# Patient Record
Sex: MALE | Race: WHITE | Employment: OTHER | URBAN - NONMETROPOLITAN AREA
[De-identification: names, ages, dates, MRNs, and addresses within clinical notes are randomized per-mention and may not be internally consistent; named-entity substitution may affect disease eponyms.]

---

## 2023-08-25 ENCOUNTER — HOSPITAL ENCOUNTER (EMERGENCY)
Age: 83
Discharge: HOME | End: 2023-08-25
Attending: EMERGENCY MEDICINE
Payer: COMMERCIAL

## 2023-08-25 VITALS
HEIGHT: 73 IN | SYSTOLIC BLOOD PRESSURE: 121 MMHG | OXYGEN SATURATION: 98 % | DIASTOLIC BLOOD PRESSURE: 77 MMHG | TEMPERATURE: 98.3 F | WEIGHT: 202 LBS | HEART RATE: 66 BPM | BODY MASS INDEX: 26.77 KG/M2 | RESPIRATION RATE: 16 BRPM

## 2023-08-25 DIAGNOSIS — L03.116 CELLULITIS OF LEFT LOWER EXTREMITY: Primary | ICD-10-CM

## 2023-08-25 PROCEDURE — 6370000000 HC RX 637 (ALT 250 FOR IP): Performed by: EMERGENCY MEDICINE

## 2023-08-25 PROCEDURE — 99283 EMERGENCY DEPT VISIT LOW MDM: CPT

## 2023-08-25 RX ORDER — CEPHALEXIN 500 MG/1
500 CAPSULE ORAL 4 TIMES DAILY
Qty: 28 CAPSULE | Refills: 0 | Status: SHIPPED | OUTPATIENT
Start: 2023-08-25 | End: 2023-09-01

## 2023-08-25 RX ORDER — CEPHALEXIN 500 MG/1
500 CAPSULE ORAL ONCE
Status: COMPLETED | OUTPATIENT
Start: 2023-08-25 | End: 2023-08-25

## 2023-08-25 RX ORDER — DOXYCYCLINE HYCLATE 100 MG
100 TABLET ORAL ONCE
Status: COMPLETED | OUTPATIENT
Start: 2023-08-25 | End: 2023-08-25

## 2023-08-25 RX ORDER — DOXYCYCLINE HYCLATE 100 MG
100 TABLET ORAL 2 TIMES DAILY
Qty: 14 TABLET | Refills: 0 | Status: SHIPPED | OUTPATIENT
Start: 2023-08-25 | End: 2023-09-01

## 2023-08-25 RX ADMIN — DOXYCYCLINE HYCLATE 100 MG: 100 TABLET, COATED ORAL at 14:56

## 2023-08-25 RX ADMIN — CEPHALEXIN 500 MG: 500 CAPSULE ORAL at 14:56

## 2023-08-25 ASSESSMENT — ENCOUNTER SYMPTOMS
SHORTNESS OF BREATH: 0
COLOR CHANGE: 1
NAUSEA: 0
COUGH: 0
VOMITING: 0

## 2023-08-25 ASSESSMENT — PAIN - FUNCTIONAL ASSESSMENT: PAIN_FUNCTIONAL_ASSESSMENT: NONE - DENIES PAIN

## 2023-08-25 NOTE — ED NOTES
Reviewed patient discharge instructions at this time, copy given to patient. No questions or concerns. Patient voiced understanding.         Dagmar Guerrero RN  08/25/23 [id number]

## 2023-08-25 NOTE — ED PROVIDER NOTES
Comments: The area of most redness, mid thigh, is firm, no obvious fluid collection, no fluctuance. Slightly tender to palpation. Erythematous, warm to the touch. Neurological:      General: No focal deficit present. Mental Status: He is alert and oriented to person, place, and time. Psychiatric:         Mood and Affect: Mood normal.         Thought Content: Thought content normal.             PROCEDURES:  Unless otherwise noted below, none. Procedures      MEDICATIONS:   Medications   cephALEXin (KEFLEX) capsule 500 mg (has no administration in time range)   doxycycline hyclate (VIBRA-TABS) tablet 100 mg (has no administration in time range)       _____________________________________    MEDICAL DECISION MAKING:     Patient is a 79 y.o. male with a significant PMHx of CAD and CHF, presenting emergency department today with concerns of what appears to be cellulitis from possible bug bites? Earlier some other lesions that are scattered across his leg. Patient gardens, is outside quite frequently. No known tick bites. Other hives, will treat with Keflex and doxycycline. No abscess today in the ED. No SIRS criteria, and otherwise appears very well. Interactive, conversational, and discharged home in stable condition to follow-up with PCP. Strict return precautions provided at length. Patient very comfortable with this plan. Is this patient to be included in the SEP-1 Core Measure due to severe sepsis or septic shock? No   Exclusion criteria - the patient is NOT to be included for SEP-1 Core Measure due to:  2+ SIRS criteria are not met      CLINICAL IMPRESSION:     ICD-10-CM    1. Cellulitis of left lower extremity  L03. 116             DISPOSITION:  I discussed the results, including any incidental findings, with patient and through shared decision making;   Disposition today from the ED will be: Discharge to home in fair condition. Questions answered.  They are agreeable to plan and express understanding of plan. Social Determinants : None      CRITICAL CARE:   Total critical care time is 00 minutes, which excludes separately billable procedures and updating family. Time spent is specifically for management of the presenting complaint and symptoms initially, direct bedside care, reevaluation, review of records, and consultation. There was a high probability of clinically significant life-threatening deterioration in the patient's condition, which required my urgent intervention. _____________________________________    DISCHARGE MEDICATIONS (if applicable):  New Prescriptions    CEPHALEXIN (KEFLEX) 500 MG CAPSULE    Take 1 capsule by mouth 4 times daily for 7 days    DOXYCYCLINE HYCLATE (VIBRA-TABS) 100 MG TABLET    Take 1 tablet by mouth 2 times daily for 7 days       DISCONTINUED MEDICATIONS:  Discontinued Medications    No medications on file            DISPOSITION REFERRAL (if applicable):  [address] [place] [address] Emergency Department  [address] [place] [address]  [phone]    As needed, If symptoms worsen    Juancho Lima MD  [address] [place] [address],[address]  [phone]    Schedule an appointment as soon as possible for a visit         _____________________________________      Nancy Frazier DO, ([facility]) am the primary attending of record and contributed the majority of evaluation and treatment of emergent care for this encounter. This chart was generated in part by using Dragon Dictation system and may contain errors related to that system including errors in grammar, punctuation, and spelling, as well as words and phrases that may be inappropriate. If there are any questions or concerns please feel free to contact the dictating provider for clarification.      MADDISON FRAZIER DO (electronically signed)   [id number] DO  08/25/23 [id number]

## 2025-05-16 NOTE — PROGRESS NOTES
Crow Mukherjee    Age 84 y.o.    male    1940    [facility] [phone]    5/27/2025  Arrival Time_____________  OR Time____________30 Min     Procedure(s):  ESOPHAGOGASTRODUODENOSCOPY                      Monitor Anesthesia Care    Surgeon(s):  Wilmer Mcgarry, MD       Phone [phone] (home)     [facility]  Date  Info Source  Home  Cell         Work  _____________________________________________________________________  _____________________________________________________________________  _____________________________________________________________________  _____________________________________________________________________  _____________________________________________________________________    PCP _____________________________ Phone_________________     H&P  ________________  Bringing      Chart              Epic      DOS      Called________  EKG ________________   Bringing      Chart              Epic      DOS      Called________  LABS________________   Bringing     Chart              Epic      DOS      Called________  Cardiac Clearance ______ Bringing      Chart              Epic      DOS      Called________  Pulmonary Clearance____ Bringing      Chart              Epic      DOS      Called________    Cardiologist________________________ Phone___________________________  Pulmonologist_______________________Phone___________________________    ? Advance Directives   ? Nondenominational concerns / Waiver on Chart            PAT Communications________________  ? Pre-op Instructions Given /Understood          _________________________________  ? Directions to Surgery Center                          _________________________________  ? Transportation Home_______________      __________________________________  ? Crutches/Walker__________________        __________________________________    Orders: Hard copy/ EPIC                 Transcribed/ EPIC              _______Wt.    ________Pharmacy

## 2025-05-19 RX ORDER — LOSARTAN POTASSIUM AND HYDROCHLOROTHIAZIDE 25; 100 MG/1; MG/1
1 TABLET ORAL DAILY
COMMUNITY

## 2025-05-19 RX ORDER — M-VIT,TX,IRON,MINS/CALC/FOLIC 27MG-0.4MG
1 TABLET ORAL DAILY
COMMUNITY

## 2025-05-19 RX ORDER — FOLIC ACID 1 MG/1
1 TABLET ORAL DAILY
COMMUNITY

## 2025-05-19 RX ORDER — TRAZODONE HYDROCHLORIDE 50 MG/1
50 TABLET ORAL NIGHTLY
COMMUNITY

## 2025-05-19 NOTE — PROGRESS NOTES
Date and time of surgery :   05/27/2025  1430           Arrival Time:  1330     Bring Picture ID and insurance card.  Please wear simple, loose fitting clothing to the hospital.   Do not bring valuables (money, credit cards, checkbooks, etc.)   Do not wear any makeup (including  eye makeup) and no nail polish or artificial nails on your fingers or toes.  DO NOT wear any jewelry or piercings on day of surgery.  All body piercing jewelry must be removed.  If you have dentures, they will be removed before going to the OR; we will provide you a container.  If you wear contact lenses or glasses, they will be removed; please bring a case for them.  Shower the evening before or morning of surgery with antibacterial soap.  Nothing to eat or drink after midnight the day before surgery.   You may brush your teeth and gargle the morning of surgery.  DO NOT SWALLOW WATER.   Take Carvedilol DOS - if unable to swallow, bring medication with you.  Aspirin, Ibuprofen, Advil, Naproxen, Vitamin E and other Anti-inflammatory products and supplements should be stopped for 5 -7days before surgery or as directed by your physician.  Do not smoke or drink any alcoholic beverages 24 hours prior to surgery.  This includes NA Beer. Refrain from the usage of any recreational drugs, including non-prescribed prescription drugs.   You MUST plan for a responsible adult to stay on site while you are here and take you home after your surgery. You will not be allowed to leave alone or drive yourself home. It is strongly suggested someone stay with you the first 24 hrs. Your surgery will be cancelled if you do not have a ride home.  To help prevent infection, change your sheets the night before surgery.   If you  have a Living Will and Durable Power of  for Healthcare, please bring in a copy.  Notify your Surgeon if you develop any illness between now and time of surgery. Cough, cold, fever, sore throat, nausea, vomiting, etc.  Please notify

## 2025-05-26 ENCOUNTER — ANESTHESIA EVENT (OUTPATIENT)
Dept: ENDOSCOPY | Age: 85
End: 2025-05-26
Payer: MEDICARE

## 2025-05-27 ENCOUNTER — HOSPITAL ENCOUNTER (OUTPATIENT)
Age: 85
Setting detail: OUTPATIENT SURGERY
Discharge: HOME | End: 2025-05-27
Attending: INTERNAL MEDICINE | Admitting: INTERNAL MEDICINE
Payer: MEDICARE

## 2025-05-27 ENCOUNTER — ANESTHESIA (OUTPATIENT)
Dept: ENDOSCOPY | Age: 85
End: 2025-05-27
Payer: MEDICARE

## 2025-05-27 VITALS
BODY MASS INDEX: 23.72 KG/M2 | OXYGEN SATURATION: 95 % | HEIGHT: 73 IN | HEART RATE: 86 BPM | RESPIRATION RATE: 16 BRPM | WEIGHT: 179 LBS | DIASTOLIC BLOOD PRESSURE: 68 MMHG | TEMPERATURE: 97.9 F | SYSTOLIC BLOOD PRESSURE: 99 MMHG

## 2025-05-27 DIAGNOSIS — R13.19 ESOPHAGEAL DYSPHAGIA: ICD-10-CM

## 2025-05-27 PROCEDURE — C1769 GUIDE WIRE: HCPCS | Performed by: INTERNAL MEDICINE

## 2025-05-27 PROCEDURE — 6360000002 HC RX W HCPCS: Performed by: ANESTHESIOLOGY

## 2025-05-27 PROCEDURE — 7100000010 HC PHASE II RECOVERY - FIRST 15 MIN: Performed by: INTERNAL MEDICINE

## 2025-05-27 PROCEDURE — 3700000001 HC ADD 15 MINUTES (ANESTHESIA): Performed by: INTERNAL MEDICINE

## 2025-05-27 PROCEDURE — 7100000011 HC PHASE II RECOVERY - ADDTL 15 MIN: Performed by: INTERNAL MEDICINE

## 2025-05-27 PROCEDURE — 88305 TISSUE EXAM BY PATHOLOGIST: CPT

## 2025-05-27 PROCEDURE — 3609012700 HC EGD DILATION SAVORY: Performed by: INTERNAL MEDICINE

## 2025-05-27 PROCEDURE — 6360000002 HC RX W HCPCS: Performed by: NURSE ANESTHETIST, CERTIFIED REGISTERED

## 2025-05-27 PROCEDURE — 3609012400 HC EGD TRANSORAL BIOPSY SINGLE/MULTIPLE: Performed by: INTERNAL MEDICINE

## 2025-05-27 PROCEDURE — 2709999900 HC NON-CHARGEABLE SUPPLY: Performed by: INTERNAL MEDICINE

## 2025-05-27 PROCEDURE — 2580000003 HC RX 258: Performed by: ANESTHESIOLOGY

## 2025-05-27 PROCEDURE — 3700000000 HC ANESTHESIA ATTENDED CARE: Performed by: INTERNAL MEDICINE

## 2025-05-27 RX ORDER — LABETALOL HYDROCHLORIDE 5 MG/ML
10 INJECTION, SOLUTION INTRAVENOUS
Status: DISCONTINUED | OUTPATIENT
Start: 2025-05-27 | End: 2025-05-27 | Stop reason: HOSPADM

## 2025-05-27 RX ORDER — LIDOCAINE HYDROCHLORIDE 10 MG/ML
2 INJECTION, SOLUTION INFILTRATION; PERINEURAL
Status: COMPLETED | OUTPATIENT
Start: 2025-05-27 | End: 2025-05-27

## 2025-05-27 RX ORDER — PROPOFOL 10 MG/ML
INJECTION, EMULSION INTRAVENOUS
Status: DISCONTINUED | OUTPATIENT
Start: 2025-05-27 | End: 2025-05-27 | Stop reason: SDUPTHER

## 2025-05-27 RX ORDER — SODIUM CHLORIDE 0.9 % (FLUSH) 0.9 %
5-40 SYRINGE (ML) INJECTION PRN
Status: DISCONTINUED | OUTPATIENT
Start: 2025-05-27 | End: 2025-05-27 | Stop reason: HOSPADM

## 2025-05-27 RX ORDER — OXYCODONE HYDROCHLORIDE 5 MG/1
10 TABLET ORAL PRN
Status: DISCONTINUED | OUTPATIENT
Start: 2025-05-27 | End: 2025-05-27 | Stop reason: HOSPADM

## 2025-05-27 RX ORDER — SODIUM CHLORIDE 0.9 % (FLUSH) 0.9 %
5-40 SYRINGE (ML) INJECTION EVERY 12 HOURS SCHEDULED
Status: DISCONTINUED | OUTPATIENT
Start: 2025-05-27 | End: 2025-05-27 | Stop reason: HOSPADM

## 2025-05-27 RX ORDER — OXYCODONE HYDROCHLORIDE 5 MG/1
5 TABLET ORAL PRN
Status: DISCONTINUED | OUTPATIENT
Start: 2025-05-27 | End: 2025-05-27 | Stop reason: HOSPADM

## 2025-05-27 RX ORDER — ONDANSETRON 2 MG/ML
4 INJECTION INTRAMUSCULAR; INTRAVENOUS EVERY 30 MIN PRN
Status: DISCONTINUED | OUTPATIENT
Start: 2025-05-27 | End: 2025-05-27 | Stop reason: HOSPADM

## 2025-05-27 RX ORDER — SODIUM CHLORIDE, SODIUM LACTATE, POTASSIUM CHLORIDE, CALCIUM CHLORIDE 600; 310; 30; 20 MG/100ML; MG/100ML; MG/100ML; MG/100ML
INJECTION, SOLUTION INTRAVENOUS CONTINUOUS
Status: DISCONTINUED | OUTPATIENT
Start: 2025-05-27 | End: 2025-05-27 | Stop reason: HOSPADM

## 2025-05-27 RX ORDER — SODIUM CHLORIDE 9 MG/ML
INJECTION, SOLUTION INTRAVENOUS PRN
Status: DISCONTINUED | OUTPATIENT
Start: 2025-05-27 | End: 2025-05-27 | Stop reason: HOSPADM

## 2025-05-27 RX ORDER — NALOXONE HYDROCHLORIDE 0.4 MG/ML
INJECTION, SOLUTION INTRAMUSCULAR; INTRAVENOUS; SUBCUTANEOUS PRN
Status: DISCONTINUED | OUTPATIENT
Start: 2025-05-27 | End: 2025-05-27 | Stop reason: HOSPADM

## 2025-05-27 RX ADMIN — PROPOFOL 10 MG: 10 INJECTION, EMULSION INTRAVENOUS at 14:44

## 2025-05-27 RX ADMIN — LIDOCAINE HYDROCHLORIDE 60 MG: 10 INJECTION, SOLUTION INFILTRATION; PERINEURAL at 14:33

## 2025-05-27 RX ADMIN — PROPOFOL 20 MG: 10 INJECTION, EMULSION INTRAVENOUS at 14:41

## 2025-05-27 RX ADMIN — PROPOFOL 40 MG: 10 INJECTION, EMULSION INTRAVENOUS at 14:33

## 2025-05-27 RX ADMIN — PROPOFOL 20 MG: 10 INJECTION, EMULSION INTRAVENOUS at 14:39

## 2025-05-27 RX ADMIN — SODIUM CHLORIDE, SODIUM LACTATE, POTASSIUM CHLORIDE, AND CALCIUM CHLORIDE: .6; .31; .03; .02 INJECTION, SOLUTION INTRAVENOUS at 14:33

## 2025-05-27 RX ADMIN — PROPOFOL 40 MG: 10 INJECTION, EMULSION INTRAVENOUS at 14:35

## 2025-05-27 ASSESSMENT — PAIN SCALES - GENERAL
PAINLEVEL_OUTOF10: 0

## 2025-05-27 ASSESSMENT — PAIN - FUNCTIONAL ASSESSMENT: PAIN_FUNCTIONAL_ASSESSMENT: 0-10

## 2025-05-27 NOTE — DISCHARGE INSTRUCTIONS
ENDOSCOPY:  Inspection of any cavity of the body by means of an endoscopy. An endoscope is a flexible tube that allows direct visualization of the cavity.    You have had a Esophagogastroduodenoscopy    Discharge Instructions    1)  You may experience some lightheadedness for the next several hours. We suggest you plan on quiet relation for the rest of the day.    2)  Because of the sedative drugs in your blood steam, be advised that you should not drive, operate any machinery, or sign contracts for the remainder of the day.    3)  You should not take any alcoholic beverages tonight, and only take sleeping medication that has been specifically prescribed for you by your physician.    4)  Unless instructed differently, resume your regular diet. Eat smaller portions for your first meal and progress to normal amounts over the rest of the day.    5)  If you have any fever, chills, excessive bleeding, uncontrolled pain, increased abdominal bloating, or any other problems, notify your doctor or return to the hospital.    6)  If you have a sore throat, you may use lozenges, or salt water gargles.    7) Call for biopsy results in one week:  [phone]    9)  Special Discharge Instructions:      ANESTHESIA DISCHARGE INSTRUCTIONS    You are under the influence of drugs- do not drink alcohol, drive a car, operate machinery(such as power tools, kitchen appliances, etc), sign legal documents, or make any important decisions for 24 hours (or while on pain medications).   Children should not ride bikes or skate boards or play on gym sets  for 24 hours after surgery.  A responsible adult should be with you for 24 hours.  Rest at home today- increase activity as tolerated.  Progress slowly to a regular diet unless your physician has instructed you otherwise. Drink plenty of water.    CALL YOUR DOCTOR IF YOU:  Have moderate to severe nausea or vomiting AND are unable to hold down fluids or prescribed medications.  Have

## 2025-05-27 NOTE — H&P
Gastroenterology Preop Assessment    Patient:   Crow Mukherjee   :    1940   Facility:   [facility]  Referring/PCP: Denis John MD  Date:     2025    Subjective:   Procedure: EGD    HPI/Reason for procedure:  Dysphagia    Past Medical History:   Diagnosis Date    CAD (coronary artery disease)     Cancer (HCC)     Skin cancer on arm    Cellulitis     CHF (congestive heart failure) (HCC)     Edema     Enlarged prostate     Hiatal hernia     Hyperlipidemia     Hypertension     Pericarditis 1967    Sleep apnea     No CPAP    Wears glasses      Past Surgical History:   Procedure Laterality Date    CARDIOVERSION  2006    COLONOSCOPY  2012    HERNIA REPAIR      MALIGNANT SKIN LESION EXCISION      x 2 arm and leg    TOTAL HIP ARTHROPLASTY Left        Social:   Social History     Tobacco Use    Smoking status: Former     Current packs/day: 0.00     Types: Cigarettes     Quit date: 1981     Years since quittin.6    Smokeless tobacco: Former     Types: Chew   Substance Use Topics    Alcohol use: Not Currently     Types: 7 - 14 Cans of beer per week     Family:   Family History   Problem Relation Age of Onset    Cancer Mother     Heart Disease Mother     Cancer Father     Lupus Sister     Diabetes Sister     Cancer Brother     Arthritis Sister        Scheduled Medications:     Current Medications:    Prior to Admission medications    Medication Sig Start Date End Date Taking? Authorizing Provider   losartan-hydroCHLOROthiazide (HYZAAR) 100-25 MG per tablet Take 1 tablet by mouth daily   Yes Neri Castillo MD   Multiple Vitamins-Minerals (THERAPEUTIC MULTIVITAMIN-MINERALS) tablet Take 1 tablet by mouth daily   Yes Neri Castillo MD   folic acid (FOLVITE) 1 MG tablet Take 1 tablet by mouth daily   Yes Neri Castillo MD   traZODone (DESYREL) 50 MG tablet Take 1 tablet by mouth nightly   Yes Neri Castillo MD   Lecithin 1200 MG  No

## 2025-05-27 NOTE — PROGRESS NOTES
Patient admitted to pre-op bay 7 in preparation for surgery, VSS. Consent confirmed. IV inserted into right FA, NS infusing. Belongings under stretcher. NPO since 2100. PT/INR POC drawn as ordered. Results PT 15.5 INR 1.5  Family at bedside, phone number in system for text updates, call light within reach.

## 2025-05-27 NOTE — ANESTHESIA POSTPROCEDURE EVALUATION
Department of Anesthesiology  Postprocedure Note    Patient: Crow Mukherjee  MRN: [id number]  YOB: 1940  Date of evaluation: 5/27/2025    Procedure Summary       Date: 05/27/25 Room / Location: [place] [address] / [facility]    Anesthesia Start: 1429 Anesthesia Stop: 1455    Procedures:       ESOPHAGOGASTRODUODENOSCOPY DILATION SAVORY      ESOPHAGOGASTRODUODENOSCOPY BIOPSY Diagnosis:       Esophageal dysphagia      (Esophageal dysphagia [R13.19])    Surgeons: Wilmer Mcgarry MD Responsible Provider: Moe White MD    Anesthesia Type: MAC ASA Status: 3            Anesthesia Type: MAC    Damari Phase I: Damari Score: 10    Damari Phase II: Damari Score: 9    Anesthesia Post Evaluation    Patient location during evaluation: PACU  Level of consciousness: awake  Airway patency: patent  Nausea & Vomiting: no vomiting  Cardiovascular status: blood pressure returned to baseline  Respiratory status: acceptable  Hydration status: stable  Pain management: adequate        No notable events documented.

## 2025-05-27 NOTE — ANESTHESIA PRE PROCEDURE
Former     Types: Chew   Substance Use Topics   • Alcohol use: Not Currently     Types: 7 - 14 Cans of beer per week                                Counseling given: Not Answered      Vital Signs (Current):   Vitals:    05/19/25 1411 05/27/25 1347   BP:  (!) 142/89   Pulse:  (!) 105   Resp:  16   Temp:  97.9 °F (36.6 °C)   TempSrc:  Oral   SpO2:  100%   Weight: 81.2 kg (179 lb)    Height: 1.854 m (6' 1\")                                               BP Readings from Last 3 Encounters:   05/27/25 (!) 142/89   08/25/23 121/77   09/26/12 103/74       NPO Status:                                                                                 BMI:   Wt Readings from Last 3 Encounters:   05/19/25 81.2 kg (179 lb)   08/25/23 91.6 kg (202 lb)   09/26/12 108.9 kg (240 lb)     Body mass index is 23.62 kg/m².    CBC: No results found for: \"WBC\", \"RBC\", \"HGB\", \"HCT\", \"MCV\", \"RDW\", \"PLT\"    CMP: No results found for: \"NA\", \"K\", \"CL\", \"CO2\", \"BUN\", \"CREATININE\", \"GFRAA\", \"AGRATIO\", \"LABGLOM\", \"GLUCOSE\", \"GLU\", \"CALCIUM\", \"BILITOT\", \"ALKPHOS\", \"AST\", \"ALT\"    POC Tests: No results for input(s): \"POCGLU\", \"POCNA\", \"POCK\", \"POCCL\", \"POCBUN\", \"POCHEMO\", \"POCHCT\" in the last 72 hours.    Coags:   Lab Results   Component Value Date/Time    PROTIME 29.9 08/11/2024 09:42 AM    INR 2.5 08/11/2024 09:42 AM       HCG (If Applicable): No results found for: \"PREGTESTUR\", \"PREGSERUM\", \"HCG\", \"HCGQUANT\"     ABGs: No results found for: \"PHART\", \"PO2ART\", \"KLO0UIP\", \"SLK3DTU\", \"BEART\", \"R0OCDFPV\"     Type & Screen (If Applicable):  No results found for: \"ABORH\", \"LABANTI\"    Drug/Infectious Status (If Applicable):  No results found for: \"HIV\", \"HEPCAB\"    COVID-19 Screening (If Applicable): No results found for: \"COVID19\"        Anesthesia Evaluation    Airway: Mallampati: II          Dental: normal exam         Pulmonary: breath sounds clear to auscultation                             Cardiovascular:    (+) murmur, Mitral        Rhythm:

## 2025-05-27 NOTE — OP NOTE
Esophagogastroduodenoscopy Note    Patient:   Crow Mukherjee    YOB: 1940    [place]     [facility] [place]  [address] [facility] [address]   [Outpatient]   Referring/PCP: Denis John MD    Procedure:   Esophagogastroduodenoscopy --diagnostic  Date:     5/27/2025   Endoscopist:  Wilmer Mcgarry MD     Preoperative Diagnosis:  Dysphagia    Postoperative Diagnosis:  Esophageal stricture, gastritis    Anesthesia:  Propofol per anesthesia.     Estimated blood loss: Minimal    Complications: None    Description of Procedure:  Informed consent was obtained from the patient after explanation of the procedure including indications, description of the procedure,  benefits and possible risks and complications of the procedure, and alternatives. Questions were answered.  The patient's history was reviewed and a directed physical examination was performed prior to the procedure.    Patient was monitored throughout the procedure with pulse oximetry and periodic assessment of vital signs. Patient was sedated as noted above. The Nursing staff and I performed a time out.  With the patient in the left lateral decubitus position, the Olympus videoendoscope was placed in the patient's mouth and under direct visualization passed into the esophagus. The scope was ultimately passed to the second portion of the duodenum.  Visualization was performed during both introduction and withdrawal of the endoscope and retroflexed view of the proximal stomach was obtained.     Findings::   Esophagus: There was a mild, benign, upper esophageal stricture.  The esophagus was tortuous.  There was evidence of a 2 cm single tongue of salmon-colored mucosa in the distal esophagus suggestive of Zamorano's esophagus.  A guidewire was placed endoscopically into the antrum and sequential esophageal dilation was performed over the guidewire using the 48, 51, 54 English

## (undated) DEVICE — CO2 CANN,MICROFILTER,ADULT,14',NASAL: Brand: MEDLINE

## (undated) DEVICE — FORCEPS BX L240CM JAW DIA2.8MM L CAP W/ NDL MIC MESH TOOTH

## (undated) DEVICE — GUIDEWIRE WITH SPRING TIP - SINGLE USE: Brand: SAFEGUIDE®

## (undated) DEVICE — MASK POM PROCEDURE OXY W/ HI CONCENTRATION CO2 MONITOR

## (undated) DEVICE — ENDOSCOPIC KIT 2 12 FT OP4 DE2 GWN SYR

## (undated) DEVICE — ELECTRODE ECG MONITR FOAM TEAR DROP ADLT RED

## (undated) DEVICE — CONMED SCOPE SAVER BITE BLOCK, 20X27 MM: Brand: SCOPE SAVER